# Patient Record
Sex: MALE | Race: ASIAN | NOT HISPANIC OR LATINO | URBAN - METROPOLITAN AREA
[De-identification: names, ages, dates, MRNs, and addresses within clinical notes are randomized per-mention and may not be internally consistent; named-entity substitution may affect disease eponyms.]

---

## 2019-10-13 ENCOUNTER — INPATIENT (INPATIENT)
Facility: HOSPITAL | Age: 84
LOS: 1 days | Discharge: HOME | DRG: 149 | End: 2019-10-15
Attending: PSYCHIATRY & NEUROLOGY | Admitting: PSYCHIATRY & NEUROLOGY
Payer: MEDICARE

## 2019-10-13 VITALS
TEMPERATURE: 98 F | HEIGHT: 69 IN | RESPIRATION RATE: 18 BRPM | WEIGHT: 175.05 LBS | SYSTOLIC BLOOD PRESSURE: 186 MMHG | OXYGEN SATURATION: 98 % | DIASTOLIC BLOOD PRESSURE: 77 MMHG | HEART RATE: 72 BPM

## 2019-10-13 PROCEDURE — 99285 EMERGENCY DEPT VISIT HI MDM: CPT | Mod: 25

## 2019-10-13 PROCEDURE — 93010 ELECTROCARDIOGRAM REPORT: CPT

## 2019-10-13 RX ORDER — SODIUM CHLORIDE 9 MG/ML
500 INJECTION INTRAMUSCULAR; INTRAVENOUS; SUBCUTANEOUS ONCE
Refills: 0 | Status: COMPLETED | OUTPATIENT
Start: 2019-10-13 | End: 2019-10-13

## 2019-10-13 RX ORDER — ONDANSETRON 8 MG/1
4 TABLET, FILM COATED ORAL ONCE
Refills: 0 | Status: COMPLETED | OUTPATIENT
Start: 2019-10-13 | End: 2019-10-13

## 2019-10-13 RX ORDER — MECLIZINE HCL 12.5 MG
50 TABLET ORAL ONCE
Refills: 0 | Status: COMPLETED | OUTPATIENT
Start: 2019-10-13 | End: 2019-10-13

## 2019-10-14 DIAGNOSIS — R42 DIZZINESS AND GIDDINESS: ICD-10-CM

## 2019-10-14 LAB
ALBUMIN SERPL ELPH-MCNC: 4.5 G/DL — SIGNIFICANT CHANGE UP (ref 3.3–5)
ALP SERPL-CCNC: 85 U/L — SIGNIFICANT CHANGE UP (ref 40–120)
ALT FLD-CCNC: 16 U/L — SIGNIFICANT CHANGE UP (ref 10–45)
ANION GAP SERPL CALC-SCNC: 15 MMOL/L — SIGNIFICANT CHANGE UP (ref 5–17)
ANISOCYTOSIS BLD QL: SIGNIFICANT CHANGE UP
AST SERPL-CCNC: 18 U/L — SIGNIFICANT CHANGE UP (ref 10–40)
BASOPHILS # BLD AUTO: 0 K/UL — SIGNIFICANT CHANGE UP (ref 0–0.2)
BASOPHILS NFR BLD AUTO: 0 % — SIGNIFICANT CHANGE UP (ref 0–2)
BILIRUB SERPL-MCNC: 0.5 MG/DL — SIGNIFICANT CHANGE UP (ref 0.2–1.2)
BUN SERPL-MCNC: 18 MG/DL — SIGNIFICANT CHANGE UP (ref 7–23)
CALCIUM SERPL-MCNC: 8.8 MG/DL — SIGNIFICANT CHANGE UP (ref 8.4–10.5)
CHLORIDE SERPL-SCNC: 100 MMOL/L — SIGNIFICANT CHANGE UP (ref 96–108)
CO2 SERPL-SCNC: 26 MMOL/L — SIGNIFICANT CHANGE UP (ref 22–31)
CREAT SERPL-MCNC: 1.02 MG/DL — SIGNIFICANT CHANGE UP (ref 0.5–1.3)
DACRYOCYTES BLD QL SMEAR: SLIGHT — SIGNIFICANT CHANGE UP
ELLIPTOCYTES BLD QL SMEAR: SLIGHT — SIGNIFICANT CHANGE UP
EOSINOPHIL # BLD AUTO: 0.09 K/UL — SIGNIFICANT CHANGE UP (ref 0–0.5)
EOSINOPHIL NFR BLD AUTO: 0.9 % — SIGNIFICANT CHANGE UP (ref 0–6)
GIANT PLATELETS BLD QL SMEAR: PRESENT — SIGNIFICANT CHANGE UP
GLUCOSE SERPL-MCNC: 127 MG/DL — HIGH (ref 70–99)
HCT VFR BLD CALC: 42.9 % — SIGNIFICANT CHANGE UP (ref 39–50)
HGB BLD-MCNC: 12.7 G/DL — LOW (ref 13–17)
HYPOCHROMIA BLD QL: SLIGHT — SIGNIFICANT CHANGE UP
LYMPHOCYTES # BLD AUTO: 0.41 K/UL — LOW (ref 1–3.3)
LYMPHOCYTES # BLD AUTO: 4.3 % — LOW (ref 13–44)
MACROCYTES BLD QL: SLIGHT — SIGNIFICANT CHANGE UP
MANUAL SMEAR VERIFICATION: SIGNIFICANT CHANGE UP
MCHC RBC-ENTMCNC: 18.7 PG — LOW (ref 27–34)
MCHC RBC-ENTMCNC: 29.6 GM/DL — LOW (ref 32–36)
MCV RBC AUTO: 63.1 FL — LOW (ref 80–100)
MICROCYTES BLD QL: SIGNIFICANT CHANGE UP
MONOCYTES # BLD AUTO: 0.09 K/UL — SIGNIFICANT CHANGE UP (ref 0–0.9)
MONOCYTES NFR BLD AUTO: 0.9 % — LOW (ref 2–14)
NEUTROPHILS # BLD AUTO: 8.85 K/UL — HIGH (ref 1.8–7.4)
NEUTROPHILS NFR BLD AUTO: 91.3 % — HIGH (ref 43–77)
NEUTS BAND # BLD: 1.7 % — SIGNIFICANT CHANGE UP (ref 0–8)
OVALOCYTES BLD QL SMEAR: SLIGHT — SIGNIFICANT CHANGE UP
PLAT MORPH BLD: ABNORMAL
PLATELET # BLD AUTO: 167 K/UL — SIGNIFICANT CHANGE UP (ref 150–400)
POIKILOCYTOSIS BLD QL AUTO: SLIGHT — SIGNIFICANT CHANGE UP
POLYCHROMASIA BLD QL SMEAR: SLIGHT — SIGNIFICANT CHANGE UP
POTASSIUM SERPL-MCNC: 4.3 MMOL/L — SIGNIFICANT CHANGE UP (ref 3.5–5.3)
POTASSIUM SERPL-SCNC: 4.3 MMOL/L — SIGNIFICANT CHANGE UP (ref 3.5–5.3)
PROT SERPL-MCNC: 7.2 G/DL — SIGNIFICANT CHANGE UP (ref 6–8.3)
RBC # BLD: 6.8 M/UL — HIGH (ref 4.2–5.8)
RBC # FLD: 18.6 % — HIGH (ref 10.3–14.5)
RBC BLD AUTO: ABNORMAL
SICKLE CELLS BLD QL SMEAR: SLIGHT — SIGNIFICANT CHANGE UP
SODIUM SERPL-SCNC: 141 MMOL/L — SIGNIFICANT CHANGE UP (ref 135–145)
TARGETS BLD QL SMEAR: SLIGHT — SIGNIFICANT CHANGE UP
TROPONIN T, HIGH SENSITIVITY RESULT: 10 NG/L — SIGNIFICANT CHANGE UP (ref 0–51)
TROPONIN T, HIGH SENSITIVITY RESULT: 10 NG/L — SIGNIFICANT CHANGE UP (ref 0–51)
VARIANT LYMPHS # BLD: 0.9 % — SIGNIFICANT CHANGE UP (ref 0–6)
WBC # BLD: 9.52 K/UL — SIGNIFICANT CHANGE UP (ref 3.8–10.5)
WBC # FLD AUTO: 9.52 K/UL — SIGNIFICANT CHANGE UP (ref 3.8–10.5)

## 2019-10-14 PROCEDURE — 70551 MRI BRAIN STEM W/O DYE: CPT | Mod: 26

## 2019-10-14 PROCEDURE — 70498 CT ANGIOGRAPHY NECK: CPT | Mod: 26

## 2019-10-14 PROCEDURE — 70496 CT ANGIOGRAPHY HEAD: CPT | Mod: 26

## 2019-10-14 PROCEDURE — 99223 1ST HOSP IP/OBS HIGH 75: CPT

## 2019-10-14 RX ORDER — ALLOPURINOL 300 MG
1 TABLET ORAL
Qty: 0 | Refills: 0 | DISCHARGE

## 2019-10-14 RX ORDER — ATENOLOL 25 MG/1
1 TABLET ORAL
Qty: 0 | Refills: 0 | DISCHARGE

## 2019-10-14 RX ORDER — ASPIRIN/CALCIUM CARB/MAGNESIUM 324 MG
81 TABLET ORAL DAILY
Refills: 0 | Status: DISCONTINUED | OUTPATIENT
Start: 2019-10-14 | End: 2019-10-15

## 2019-10-14 RX ORDER — ATENOLOL 25 MG/1
50 TABLET ORAL DAILY
Refills: 0 | Status: DISCONTINUED | OUTPATIENT
Start: 2019-10-14 | End: 2019-10-15

## 2019-10-14 RX ORDER — ATORVASTATIN CALCIUM 80 MG/1
80 TABLET, FILM COATED ORAL AT BEDTIME
Refills: 0 | Status: DISCONTINUED | OUTPATIENT
Start: 2019-10-14 | End: 2019-10-15

## 2019-10-14 RX ORDER — LOSARTAN POTASSIUM 100 MG/1
100 TABLET, FILM COATED ORAL DAILY
Refills: 0 | Status: DISCONTINUED | OUTPATIENT
Start: 2019-10-14 | End: 2019-10-15

## 2019-10-14 RX ORDER — DIAZEPAM 5 MG
2 TABLET ORAL ONCE
Refills: 0 | Status: DISCONTINUED | OUTPATIENT
Start: 2019-10-14 | End: 2019-10-14

## 2019-10-14 RX ORDER — LOSARTAN POTASSIUM 100 MG/1
1 TABLET, FILM COATED ORAL
Qty: 0 | Refills: 0 | DISCHARGE

## 2019-10-14 RX ORDER — AMLODIPINE BESYLATE 2.5 MG/1
1 TABLET ORAL
Qty: 0 | Refills: 0 | DISCHARGE

## 2019-10-14 RX ORDER — CLOPIDOGREL BISULFATE 75 MG/1
75 TABLET, FILM COATED ORAL ONCE
Refills: 0 | Status: COMPLETED | OUTPATIENT
Start: 2019-10-14 | End: 2019-10-14

## 2019-10-14 RX ORDER — AMLODIPINE BESYLATE 2.5 MG/1
2.5 TABLET ORAL DAILY
Refills: 0 | Status: DISCONTINUED | OUTPATIENT
Start: 2019-10-14 | End: 2019-10-15

## 2019-10-14 RX ORDER — ENOXAPARIN SODIUM 100 MG/ML
40 INJECTION SUBCUTANEOUS AT BEDTIME
Refills: 0 | Status: DISCONTINUED | OUTPATIENT
Start: 2019-10-14 | End: 2019-10-15

## 2019-10-14 RX ADMIN — Medication 2 MILLIGRAM(S): at 04:28

## 2019-10-14 RX ADMIN — Medication 50 MILLIGRAM(S): at 00:12

## 2019-10-14 RX ADMIN — Medication 81 MILLIGRAM(S): at 16:13

## 2019-10-14 RX ADMIN — ATORVASTATIN CALCIUM 80 MILLIGRAM(S): 80 TABLET, FILM COATED ORAL at 21:06

## 2019-10-14 RX ADMIN — ATENOLOL 50 MILLIGRAM(S): 25 TABLET ORAL at 16:14

## 2019-10-14 RX ADMIN — SODIUM CHLORIDE 500 MILLILITER(S): 9 INJECTION INTRAMUSCULAR; INTRAVENOUS; SUBCUTANEOUS at 00:13

## 2019-10-14 RX ADMIN — LOSARTAN POTASSIUM 100 MILLIGRAM(S): 100 TABLET, FILM COATED ORAL at 18:08

## 2019-10-14 RX ADMIN — ENOXAPARIN SODIUM 40 MILLIGRAM(S): 100 INJECTION SUBCUTANEOUS at 21:06

## 2019-10-14 RX ADMIN — AMLODIPINE BESYLATE 2.5 MILLIGRAM(S): 2.5 TABLET ORAL at 18:08

## 2019-10-14 RX ADMIN — CLOPIDOGREL BISULFATE 75 MILLIGRAM(S): 75 TABLET, FILM COATED ORAL at 08:21

## 2019-10-14 RX ADMIN — SODIUM CHLORIDE 500 MILLILITER(S): 9 INJECTION INTRAMUSCULAR; INTRAVENOUS; SUBCUTANEOUS at 01:15

## 2019-10-14 RX ADMIN — ONDANSETRON 4 MILLIGRAM(S): 8 TABLET, FILM COATED ORAL at 00:12

## 2019-10-14 NOTE — ED ADULT NURSE REASSESSMENT NOTE - NS ED NURSE REASSESS COMMENT FT1
Neuro at bedside. Neuro at bedside. Requesting to have pt changed to be admitted to Neurology. RN requests PA to change admit order to reflect this. Statement Selected

## 2019-10-14 NOTE — H&P ADULT - ATTENDING COMMENTS
Mr. Watts is a 85 yo M with HTN, HLD, gout presents to the ED with sudden onset dizziness. Son at bedside assists in providing the history. Patient was getting up off the couch to go to the bathroom at around 8:30 pm last night, when he all of a sudden developed dizziness.   his neurological exam is nonfocal.  Impression: Symptoms are suggestive of benign paroxysmal positional vertigo, initially admitted to the stroke service for possible posterior circulation ischemia in the setting of intracranial vertebral artery atherosclerosis.  MRI brain no evidence of ischemic infarct, recommend vestibular therapy  Continue aspirin, statins, DVT prophylaxis  PT OT and rehabilitation consult

## 2019-10-14 NOTE — H&P ADULT - NSHPLABSRESULTS_GEN_ALL_CORE
< from: CT Angio Neck w/ IV Cont (10.14.19 @ 05:09) >    IMPRESSION:     Noncontrast CT brain: No acute intracranial hemorrhage, mass effect,or   CT evidence of an acute vascular territorial infarct. Chronic ischemic   changes in the frontoparietal white matter.    CT angiography neck: Mild to moderate atherosclerotic disease at the   common carotid artery bifurcations. No significant stenosis of the   cervical internal carotid arteries based on NASCET criteria. Patent   cervical vertebral arteries. Mild luminal irregularity with   atherosclerotic calcification of the distal V3 segment of the right   vertebral artery likely due to atherosclerotic disease.    CT angiography brain: Moderate to severe stenosis of the supraclinoid   segments of the internal carotid artery secondary to atherosclerotic   calcification.    Atherosclerotic calcifications of the intradural vertebral arteries with   severe stenosis involving the proximal intradural right vertebral artery   which is near occluded. Right PICA origin is visualized.    Multifocal mild stenoses involving the distal M1 and M2 branches of the   middle cerebral artery branches bilaterally.    Mild luminal irregularity of the basilar artery likely related to   atherosclerotic disease. Short segment mild stenoses of the proximal PCAs.    < end of copied text >

## 2019-10-14 NOTE — ED ADULT NURSE REASSESSMENT NOTE - GENERAL PATIENT STATE
family/SO at bedside/resting/sleeping/comfortable appearance/cooperative
resting/sleeping/comfortable appearance/family/SO at bedside/cooperative/no change observed
resting/sleeping/smiling/interactive/family/SO at bedside/comfortable appearance/cooperative

## 2019-10-14 NOTE — H&P ADULT - NSHPPHYSICALEXAM_GEN_ALL_CORE
Neurological Exam:  Mental Status: Orientated to self, date and place.  Attention intact.  No dysarthria. Speech fluent.  Cranial Nerves: EOMI, VFF, no nystagmus.   No facial asymmetry.  Tongue midline.            Strength:     [] Upper extremity                      Delt       Bicep    Tricep                                                  R         5/5 5/5 5/5 5/5                                               L          5/5 5/5 5/5 5/5  [] Lower extremity                       HF          KE          KF        DF         PF                                               R        5/5 5/5 5/5 5/5 5/5                                               L         5/5 5/5 5/5 5/5 5/5  Pronator drift: none                 Dysmetria: None to finger-nose-finger or heel-shin-heel  No truncal ataxia.    Tremor: No resting, postural or action tremor.  No myoclonus.    Sensation: intact to light touch, pinprick, vibration and proprioception    Deep Tendon Reflexes:     Biceps          Triceps      BR        Patellar        Ankle         Babinski                                         R       2+                   2+           2+            2+               2+           downgoing                                         L        2+                  2+           2+            2+               2+           downgoing    Gait: normal. Neurological Exam:  Mental Status: Orientated to self, date and place.  Attention intact.  No dysarthria. Speech fluent.  Cranial Nerves: EOMI, VFF, no nystagmus.   No facial asymmetry.  Tongue midline.            Strength:     [] Upper extremity                      Delt       Bicep    Tricep                                                  R         5/5 5/5 5/5 5/5                                               L          5/5 5/5 5/5       5/5    [] Lower extremity                       HF          KE          KF                                                       R        5/5 5/5 5/5                                                      L         5/5 5/5       5/5         Pronator drift: none                 Dysmetria: None to finger-nose-finger or heel-shin-heel    Sensation: intact to light touch      Gait: normal. Neurological Exam:  Mental Status: Orientated to self, date and place.  Attention intact.  No dysarthria. Speech fluent.  Cranial Nerves: EOMI, VFF, no nystagmus.   No facial asymmetry.  Tongue midline.            Strength:     [] Upper extremity                      Delt       Bicep    Tricep                                                  R         5/5 5/5 5/5 5/5                                               L          5/5 5/5 5/5 5/5    [] Lower extremity                       HF          KE          KF                                                       R        5/5 5/5 5/5                                                      L         5/5 5/5 5/5         Pronator drift: none                 Dysmetria: None to finger-nose-finger or heel-shin-heel    Sensation: intact to light touch    Gait: unable to ambulate    could not tolerate head thrust manuever  [place] hallpike deferred

## 2019-10-14 NOTE — ED PROVIDER NOTE - PROGRESS NOTE DETAILS
Bonifacio: Patient has had no relief after meclizine and now valium Bonifacio: Patient has had no relief after meclizine and now valium.  CTA head neck pending, neuro will be consulted and patient to be admitted.

## 2019-10-14 NOTE — OCCUPATIONAL THERAPY INITIAL EVALUATION ADULT - PERTINENT HX OF CURRENT PROBLEM, REHAB EVAL
84M with HTN, HLD, gout presents to the ED with sudden onset dizziness. Son at bedside assists in providing the history. Josh was getting up off the couch to go to the bathroom at around 8:30 pm last night, when he all of a sudden developed dizziness. Reports a sensation of off balance. Movement in either direction makes symptoms worse.

## 2019-10-14 NOTE — ED ADULT NURSE REASSESSMENT NOTE - NS ED NURSE REASSESS COMMENT FT1
Spoke to Neuro MD, pt okay to stay as Tele Admit and go to 4 [facility] bed. Spoke to Neuro MD, pt okay to stay as Tele Admit and go to 4 [facility] bed.  Pt AAOx3, NAD, resp nonlabored, resting comfortably in bed with family at bedside, pt is neurologically intact at this time. Pt c/o same dizziness with head movement, none at rest at this time.   Pt denies headache, chest pain, palpitations, SOB, abd pain, n/v/d, fevers, chills, weakness, numbness/tingling at this time. Pt awaiting bed. Safety maintained.

## 2019-10-14 NOTE — ED ADULT NURSE NOTE - NSIMPLEMENTINTERV_GEN_ALL_ED
Implemented All Fall Risk Interventions:  [place] to call system. Call bell, personal items and telephone within reach. Instruct patient to call for assistance. Room bathroom lighting operational. Non-slip footwear when patient is off stretcher. Physically safe environment: no spills, clutter or unnecessary equipment. Stretcher in lowest position, wheels locked, appropriate side rails in place. Provide visual cue, wrist band, yellow gown, etc. Monitor gait and stability. Monitor for mental status changes and reorient to person, place, and time. Review medications for side effects contributing to fall risk. Reinforce activity limits and safety measures with patient and family.

## 2019-10-14 NOTE — ED PROVIDER NOTE - ATTENDING CONTRIBUTION TO CARE
I performed a history and physical exam of the patient and discussed their management with the resident.  I reviewed the resident's note and agree with the documented findings and plan of care except as noted below. My medical decision making and observations are as follows:    84 M pmh HTN p/w dizziness nausea/vomiting I performed a history and physical exam of the patient and discussed their management with the resident.  I reviewed the resident's note and agree with the documented findings and plan of care except as noted below. My medical decision making and observations are as follows:    84 M pmh HTN p/w sudden onset dizziness nausea/vomiting.  Pt was laying on couch watching tv, rolled over to the side and had sudden dizziness described as "drowsiness sensation."  Pt went ot the bathroom, when he laid back down on the couch, the dizziness was worse and patient had nausea/vomiting.  2 more episodes of nbnb vomiting prior to being seen in ER.  denies chest pain, shortness of breath, abd pain, f/c.  no sick contacts. Dizziness is better at rest and worse with any head/body movements.  Pt appears uncomfortable, A&O x 3, heart rrr, lungs cta, abd soft ntnd, CN 2-12 in tact, 5/5 strength and equal sensation bilaterally, normal finger to nose, no pronator drift, PERRL, EOMI, TM clear but moderate cerumen in right ear.  Positional symptoms sound vertiginous in nature, however differential includes cardiac and neuro causes.  Will check labs, EKG, give symptoms control fluids and low threshold for neuro imaging if symptoms do not resolve with meds. I performed a history and physical exam of the patient and discussed their management with the resident.  I reviewed the resident's note and agree with the documented findings and plan of care except as noted below. My medical decision making and observations are as follows:    84 M pmh HTN p/w sudden onset dizziness nausea/vomiting.  Pt was laying on couch watching tv, rolled over to the side and had sudden dizziness described as "drowsiness sensation."  Pt went ot the bathroom, when he laid back down on the couch, the dizziness was worse and patient had nausea/vomiting.  2 more episodes of nbnb vomiting prior to being seen in ER.  denies chest pain, shortness of breath, abd pain, f/c.  no sick contacts. Dizziness is better at rest and worse with any head/body movements, however dizziness does not resolve at rest.  Pt appears uncomfortable, A&O x 3, heart rrr, lungs cta, abd soft ntnd, CN 2-12 in tact, 5/5 strength and equal sensation bilaterally, normal finger to nose, no pronator drift, PERRL, EOMI, TM clear but moderate cerumen in right ear.  Positional symptoms sound vertiginous in nature, however differential includes cardiac and neuro causes.  Will check labs, EKG, give symptoms control fluids and low threshold for neuro imaging if symptoms do not resolve with meds.

## 2019-10-14 NOTE — ED PROVIDER NOTE - CLINICAL SUMMARY MEDICAL DECISION MAKING FREE TEXT BOX
85yo male presenting with vertigo symptoms.  History and pe point toward peripheral cause - will treat symptomatically and reassess.  Will obtain basic labs.  Less likely cardiac but will obtain EKG, trop.  Will obtain imaging if symptoms do not improve.

## 2019-10-14 NOTE — H&P ADULT - HISTORY OF PRESENT ILLNESS
83 yo M with HTN, HLD, gout presents to the ED with sudden onset dizziness. Son at bedside assists in providing the history. Josh was getting up off the couch to go to the bathroom at around 8:30 pm last night, when he all of a sudden developed dizziness. Denies room spinning sensation. Reports a sensation of off balance. Movement in either direction makes symptoms worse. No prior history. No diplopia, dysphagia, numbness, weakness.

## 2019-10-14 NOTE — H&P ADULT - ASSESSMENT
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    Plan:  - Aspirin and clopidogrel for aggressive secondary stroke prevention for 3 months considering symptomatic intracranial large vessel severe atherosclerotic stenosis followed by single antiplatelet agent  - Agree with pharmacological DVT prophylaxis   - Atorvastatin 80 mg at bedtime once able to pass the swallow evaluation or enteral access is established; titrate the dose according to LDL  - HbA1C and LDL  - Permissive HTN up to 220/110 mmHg for first 48-72 hours from symptom onset followed by gradual normotension  - MRI brain without contrast   - TTE with bubble study and continue with telemetry to screen for possible cardiac source of embolism  - Prolonged cardiac monitoring with ICM to screen for occult cardiac arrhythmia like atrial fibrillation being the cause of possible cardiac source of embolism to be considered based on results of above mentioned cardiac tests    - PT/OT

## 2019-10-14 NOTE — ED ADULT NURSE REASSESSMENT NOTE - NS ED NURSE REASSESS COMMENT FT1
Pt back from MRI. Pt is AAOx3, NAD, resp nonlabored, resting comfortably in bed with son at bedside. Pt is neurologically intact at this time. Pt c/o same dizziness with moving head. Pt denies headache, chest pain, palpitations, SOB, abd pain, n/v/d, fevers, chills, weakness at this time. Pt awaiting bed. Safety maintained.

## 2019-10-14 NOTE — OCCUPATIONAL THERAPY INITIAL EVALUATION ADULT - ADDITIONAL COMMENTS
CT Angio Head (10/14): Multifocal mild stenoses involving the distal M1 and M2 branches of the   middle cerebral artery branches bilaterally. Mild luminal irregularity of the basilar artery likely related to   atherosclerotic disease. Short segment mild stenoses of the proximal PCAs. MRI Head (10/14): No acute infarcts or acute hemorrhage. Small old microhemorrhage right ventral eliza.

## 2019-10-14 NOTE — ED ADULT NURSE REASSESSMENT NOTE - NS ED NURSE REASSESS COMMENT FT1
Report received from Ezequiel KERR. Pt AAOx4, NAD, resp nonlabored, skin warm/dry, resting comfortably in bed with family at bedside. Pt c/o dizziness with head movement. Pt denies headache, chest pain, palpitations, SOB, abd pain, n/v/d, fevers, chills, weakness at this time. Pt awaiting dispo. Safety maintained.

## 2019-10-14 NOTE — CONSULT NOTE ADULT - SUBJECTIVE AND OBJECTIVE BOX
HPI: 85 yo M with HTN, HLD, gout presents to the ED with sudden onset dizziness. Son at bedside assists in providing the history. Josh was getting up off the couch to go to the bathroom at around 8:30 pm last night, when he all of a sudden developed dizziness. Denies room spinning sensation. Reports a sensation of off balance. Movement in either direction makes symptoms worse. No prior history. No diplopia, dysphagia, numbness, weakness.    (Stroke only)  NIHSS:   MRS: 0    REVIEW OF SYSTEMS  General:	  Skin/Breast:	  Ophthalmologic:  ENMT:	  Respiratory and Thorax:	  Cardiovascular:	  Gastrointestinal:	  Genitourinary:	  Musculoskeletal:	  Neurological:	  Psychiatric:	  Hematology/Lymphatics:	  Endocrine:	  Allergic/Immunologic:	    A 10-system ROS was performed and is negative except for those items noted above and/or in the HPI.    PAST MEDICAL & SURGICAL HISTORY:  No pertinent past medical history  No significant past surgical history    FAMILY HISTORY:    SOCIAL HISTORY:   T/E/D:   Occupation:   Lives with:     MEDICATIONS (HOME):  Home Medications:    MEDICATIONS  (STANDING):    MEDICATIONS  (PRN):    ALLERGIES/INTOLERANCES:  Allergies  No Known Allergies    Intolerances    VITALS & EXAMINATION:  Vital Signs Last 24 Hrs  T(C): 36.7 (14 Oct 2019 05:20), Max: 36.8 (13 Oct 2019 22:49)  T(F): 98.1 (14 Oct 2019 05:20), Max: 98.3 (13 Oct 2019 22:49)  HR: 68 (14 Oct 2019 05:20) (68 - 73)  BP: 180/87 (14 Oct 2019 05:20) (174/83 - 186/77)  BP(mean): --  RR: 18 (14 Oct 2019 05:20) (18 - 18)  SpO2: 97% (14 Oct 2019 05:20) (95% - 100%)    General:  Constitutional: Obese Male, appears stated age, in no apparent distress including pain  Head: Normocephalic & atraumatic.  ENT: Patent ear canals, intact TM, mucus membranes moist & pink, neck supple, no lymphadenopathy.   Respiratory: Patent airway. All lung fields are clear to auscultation bilaterally.  Extremities: No cyanosis, clubbing, or edema.  Skin: No rashes, bruising, or discoloration.    Cardiovascular (>2): RRR no murmurs. Carotid pulsations symmetric, no bruits. Normal capillary beds refill, 1-2 seconds or less.     Neurological (>12):  MS: Awake, alert, oriented to person, place, situation, time. Normal affect. Follows all commands.    Language: Speech is clear, fluent with good repetition & comprehension (able to name objects___)    CNs: PERRLA (R = 3mm, L = 3mm). VFF. EOMI no nystagmus, no diplopia. V1-3 intact to LT/pinprick, well developed masseter muscles b/l. No facial asymmetry b/l, full eye closure strength b/l. Hearing grossly normal (rubbing fingers) b/l. Symmetric palate elevation in midline. Gag reflex deferred. Head turning & shoulder shrug intact b/l. Tongue midline, normal movements, no atrophy.    Fundoscopic: pale w/ sharp discs margins No vascular changes.      Motor: Normal muscle bulk & tone. No noticeable tremor or seizure. No pronator drift.              Deltoid	Biceps	Triceps	Wrist	Finger ABd	   R	5	5	5	5	5		5 	  L	5	5	5	5	5		5    	H-Flex	H-Ext	H-ABd	H-ADd	K-Flex	K-Ext	D-Flex	P-Flex  R	5	5	5	5	5	5	5	5 	   L	5	5	5	5	5	5	5	5	     Sensation: Intact to LT/PP/Temp/Vibration/Position b/l throughout.     Cortical: Extinction on DSS (neglect): none    Reflexes:              Biceps(C5)       BR(C6)     Triceps(C7)               Patellar(L4)    Achilles(S1)    Plantar Resp  R	2	          2	             2		        2		    2		Down   L	2	          2	             2		        2		    2		Down     Coordination: intact rapid-alt movements. No dysmetria to FTN/HTS    Gait: Normal Romberg. No postural instability. Normal stance and tandem gait.     LABORATORY:  CBC                       12.7   9.52  )-----------( 167      ( 14 Oct 2019 00:19 )             42.9     Chem 10-14    141  |  100  |  18  ----------------------------<  127<H>  4.3   |  26  |  1.02    Ca    8.8      14 Oct 2019 00:19    TPro  7.2  /  Alb  4.5  /  TBili  0.5  /  DBili  x   /  AST  18  /  ALT  16  /  AlkPhos  85  10-14    LFTs LIVER FUNCTIONS - ( 14 Oct 2019 00:19 )  Alb: 4.5 g/dL / Pro: 7.2 g/dL / ALK PHOS: 85 U/L / ALT: 16 U/L / AST: 18 U/L / GGT: x           Coagulopathy   Lipid Panel   A1c   Cardiac enzymes     U/A   CSF  Immunological  Other    STUDIES & IMAGING:  Studies (EKG, EEG, EMG, etc):     Radiology (XR, CT, MR, U/S, TTE/NIKKI):

## 2019-10-14 NOTE — ED ADULT NURSE NOTE - OBJECTIVE STATEMENT
84M to ED via EMS s/p sudden onset of dizzy/nausea/vomiting x2 and hypertension. Pt is alert and oriented x3, calm/cooperative, NAD, VSS. Family at bedside. Pt denies fever, chest pain, SOB, abd pain.  Pt returned from [place] with wife two weeks ago, where spouse had "fluid imbalance" in her ears and was treated with unknown medication at that time.   Pt has 20 ga to RAC.

## 2019-10-14 NOTE — OCCUPATIONAL THERAPY INITIAL EVALUATION ADULT - LIVES WITH, PROFILE
Pt lives in a house with 2 JONAS and no steps inside. Pt has a walk in shower. Does not use DME/AE, I in ADLs and functional mobility PTA.

## 2019-10-14 NOTE — ED PROVIDER NOTE - OBJECTIVE STATEMENT
85yo male presenting with sudden onset dizziness with associated nausea and vomiting.  States symptoms started while he was watching tv. 83yo male presenting with sudden onset dizziness with associated nausea and vomiting.  States symptoms started while he was watching tv around 9pm and have been present since.  States he gets dizzy when he moves his head or body and describes a drowsiness sensation with the dizziness.  He admits to 3 episodes of vomiting prior to arrival, first episode occurred with symptom onset.  Denies fevers, chills, sob, cp, palpitations, abdominal pain, diarrhea, urinary complaints.

## 2019-10-14 NOTE — ED PROVIDER NOTE - PHYSICAL EXAMINATION
General appearance: NAD, conversant, afebrile    Eyes: anicteric sclerae, TRENA, EOMI   HENT: Atraumatic;   Neck: Trachea midline; Full range of motion, supple;    Pulm: CTA bl, normal respiratory effort and no intercostal retractions, normal work of breathing   CV: RRR, No murmurs, rubs, or gallops.    Abdomen: Soft, non-tender, non-distended; no masses or hepatosplenomegaly.   Extremities: No peripheral edema   Neuro: CN2-12 grossly intact, MS +5/5 in UE and LE BL, finger to nose smooth and rapid, gross sensation intact in UE and LE BL, gait smooth and coordinated,, negative pronator drift    Skin: Dry, normal temperature, turgor and texture;    Psych: Appropriate affect, cooperative; alert and oriented to person, place and time General appearance: NAD, conversant, afebrile    Eyes: anicteric sclerae, TRENA, EOMI   HENT: Atraumatic;   Neck: Trachea midline; Full range of motion, supple;    Pulm: CTA bl, normal respiratory effort and no intercostal retractions, normal work of breathing   CV: RRR, No murmurs, rubs, or gallops.    Abdomen: Soft, non-tender, non-distended; no masses or hepatosplenomegaly.   Extremities: No peripheral edema   Neuro: CN2-12 grossly intact, MS +5/5 in UE and LE BL, finger to nose smooth but slow, gross sensation intact in UE and LE BL, negative pronator drift, patient feels too unstable to try ambulating - gait deferred    Skin: Dry, normal temperature, turgor and texture;    Psych: Appropriate affect, cooperative; alert and oriented to person, place and time

## 2019-10-15 VITALS
DIASTOLIC BLOOD PRESSURE: 77 MMHG | RESPIRATION RATE: 20 BRPM | TEMPERATURE: 99 F | HEART RATE: 76 BPM | SYSTOLIC BLOOD PRESSURE: 180 MMHG | OXYGEN SATURATION: 95 %

## 2019-10-15 LAB
CHOLEST SERPL-MCNC: 147 MG/DL — SIGNIFICANT CHANGE UP (ref 10–199)
ESTIMATED AVERAGE GLUCOSE: 103 MG/DL — SIGNIFICANT CHANGE UP (ref 68–114)
HBA1C BLD-MCNC: 5.2 % — SIGNIFICANT CHANGE UP (ref 4–5.6)
HBA1C BLD-MCNC: 5.2 % — SIGNIFICANT CHANGE UP (ref 4–5.6)
HDLC SERPL-MCNC: 26 MG/DL — LOW
LIPID PNL WITH DIRECT LDL SERPL: 77 MG/DL — SIGNIFICANT CHANGE UP
TOTAL CHOLESTEROL/HDL RATIO MEASUREMENT: 5.7 RATIO — SIGNIFICANT CHANGE UP (ref 3.4–9.6)
TRIGL SERPL-MCNC: 222 MG/DL — HIGH (ref 10–149)

## 2019-10-15 PROCEDURE — 70496 CT ANGIOGRAPHY HEAD: CPT

## 2019-10-15 PROCEDURE — 93005 ELECTROCARDIOGRAM TRACING: CPT

## 2019-10-15 PROCEDURE — 80061 LIPID PANEL: CPT

## 2019-10-15 PROCEDURE — 85027 COMPLETE CBC AUTOMATED: CPT

## 2019-10-15 PROCEDURE — 96374 THER/PROPH/DIAG INJ IV PUSH: CPT | Mod: XU

## 2019-10-15 PROCEDURE — 84484 ASSAY OF TROPONIN QUANT: CPT

## 2019-10-15 PROCEDURE — 99231 SBSQ HOSP IP/OBS SF/LOW 25: CPT | Mod: GC

## 2019-10-15 PROCEDURE — 97535 SELF CARE MNGMENT TRAINING: CPT

## 2019-10-15 PROCEDURE — 96361 HYDRATE IV INFUSION ADD-ON: CPT | Mod: XU

## 2019-10-15 PROCEDURE — 99285 EMERGENCY DEPT VISIT HI MDM: CPT | Mod: 25

## 2019-10-15 PROCEDURE — 70551 MRI BRAIN STEM W/O DYE: CPT

## 2019-10-15 PROCEDURE — 80053 COMPREHEN METABOLIC PANEL: CPT

## 2019-10-15 PROCEDURE — 97166 OT EVAL MOD COMPLEX 45 MIN: CPT

## 2019-10-15 PROCEDURE — 97530 THERAPEUTIC ACTIVITIES: CPT

## 2019-10-15 PROCEDURE — 70498 CT ANGIOGRAPHY NECK: CPT

## 2019-10-15 PROCEDURE — 83036 HEMOGLOBIN GLYCOSYLATED A1C: CPT

## 2019-10-15 RX ORDER — CLONAZEPAM 1 MG
0.5 TABLET ORAL
Refills: 0 | Status: DISCONTINUED | OUTPATIENT
Start: 2019-10-15 | End: 2019-10-15

## 2019-10-15 RX ORDER — CLONAZEPAM 1 MG
1 TABLET ORAL
Qty: 20 | Refills: 0
Start: 2019-10-15 | End: 2019-10-24

## 2019-10-15 RX ADMIN — LOSARTAN POTASSIUM 100 MILLIGRAM(S): 100 TABLET, FILM COATED ORAL at 05:24

## 2019-10-15 RX ADMIN — Medication 0.5 MILLIGRAM(S): at 12:56

## 2019-10-15 RX ADMIN — AMLODIPINE BESYLATE 2.5 MILLIGRAM(S): 2.5 TABLET ORAL at 05:24

## 2019-10-15 RX ADMIN — ATENOLOL 50 MILLIGRAM(S): 25 TABLET ORAL at 05:24

## 2019-10-15 NOTE — PHYSICAL THERAPY INITIAL EVALUATION ADULT - DISCHARGE DISPOSITION, PT EVAL
outpatient PT/DC home with outpt vestibular PT services, recommend rolling walker,assist from family, will be staying at sons per CLINT Martínez.

## 2019-10-15 NOTE — PHYSICAL THERAPY INITIAL EVALUATION ADULT - PRECAUTIONS/LIMITATIONS, REHAB EVAL
CT Angio Head (10/14): Multifocal mild stenoses involving the distal M1 and M2 branches of the/fall precautions

## 2019-10-15 NOTE — PHYSICAL THERAPY INITIAL EVALUATION ADULT - ADDITIONAL COMMENTS
· Lives With: Pt lives in a house with 2 JONAS and no steps inside. Pt has a walk in shower. Does not use DME/AE, I in ADLs and functional mobility PTA.

## 2019-10-15 NOTE — PHYSICAL THERAPY INITIAL EVALUATION ADULT - GENERAL OBSERVATIONS, REHAB EVAL
t is A&OX4, english speaking, follwos all commands, received amb at bedside with OT at bedside. AMb limited 2* dizziness/woozy.

## 2019-10-15 NOTE — DISCHARGE NOTE PROVIDER - NSDCCPCAREPLAN_GEN_ALL_CORE_FT
PRINCIPAL DISCHARGE DIAGNOSIS  Diagnosis: Peripheral vertigo  Assessment and Plan of Treatment: PRINCIPAL DISCHARGE DIAGNOSIS  Diagnosis: Peripheral vertigo  Assessment and Plan of Treatment: Outpatient vestibular therapy  Follow up with neurologist

## 2019-10-15 NOTE — DISCHARGE NOTE NURSING/CASE MANAGEMENT/SOCIAL WORK - PATIENT PORTAL LINK FT
You can access the FollowMyHealth Patient Portal offered by [facility] by registering at the following website: [email or web address]. By joining [facility]’s FollowMyHealth portal, you will also be able to view your health information using other applications (apps) compatible with our system.

## 2019-10-15 NOTE — PHYSICAL THERAPY INITIAL EVALUATION ADULT - PERTINENT HX OF CURRENT PROBLEM, REHAB EVAL
Pt is a 83 yo M admitted to [facility] with hx HTN, HLD, gout presents to the ED with sudden onset dizziness.Pt was getting up off the couch to go to the bathroom at around 8:30 pm last night, when he all of a sudden developed dizziness. Denies room spinning sensation. Reports a sensation of off balance. Movement in either direction makes symptoms worse. No prior history. No diplopia, dysphagia, numbness, weakness.

## 2019-10-15 NOTE — DISCHARGE NOTE NURSING/CASE MANAGEMENT/SOCIAL WORK - FLU SEASON?
You were seen in the emergency department for pain in your foot.  Your x-rays and exam were reassuring.  The cause of your pain is not clear, however could be tendinitis, stress fracture, or other injuries.  This does not appear to be dangerous at the moment.  Please follow-up with your regular doctor.  You are also being provided with information for a referral to orthopedics if needed.  Please wear firm soled shoes, limit the time you are walking.  Being provided with crutches to use as needed.    For pain you can take ibuprofen (Motrin), 600mg every 6 hours as needed for pain (take with food to avoid GI upset). You can also take acetaminophen (Tylenol), 1000mg every 8 hours as needed for pain. Do not take more than 3000mg of acetaminophen in any 24 hour period.  Taking these medications regularly during the day can be very effective in controlling pain.    Please return to the emergency department or seek medical attention if you develop:  Fevers, difficulty breathing, cold dusky toes, any other new or concerning findings   Yes...

## 2019-10-15 NOTE — DISCHARGE NOTE PROVIDER - HOSPITAL COURSE
83 yo M with HTN, HLD, gout presents to the ED with sudden onset dizziness. Son at bedside assists in providing the history. Josh was getting up off the couch to go to the bathroom at around 8:30 pm last night, when he all of a sudden developed dizziness. Denies room spinning sensation. Reports a sensation of off balance. Movement in either direction makes symptoms worse. No prior history. No diplopia, dysphagia, numbness, weakness.        < from: CT Angio Neck w/ IV Cont (10.14.19 @ 05:09) >    IMPRESSION:         Noncontrast CT brain: No acute intracranial hemorrhage, mass effect,or     CT evidence of an acute vascular territorial infarct. Chronic ischemic     changes in the frontoparietal white matter.        CT angiography neck: Mild to moderate atherosclerotic disease at the     common carotid artery bifurcations. No significant stenosis of the     cervical internal carotid arteries based on NASCET criteria. Patent     cervical vertebral arteries. Mild luminal irregularity with     atherosclerotic calcification of the distal V3 segment of the right     vertebral artery likely due to atherosclerotic disease.        CT angiography brain: Moderate to severe stenosis of the supraclinoid     segments of the internal carotid artery secondary to atherosclerotic     calcification.        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 Right PICA origin is visualized.        Multifocal mild stenoses involving the distal M1 and M2 branches of the     middle cerebral artery branches bilaterally.        Mild luminal irregularity of the basilar artery likely related to     atherosclerotic disease. Short segment mild stenoses of the proximal PCAs.            SANJAY LEAL M.D., RADIOLGY RESIDENT        < end of copied text >        < from: MR Head No Cont (10.14.19 @ 11:23) >    IMPRESSION: No acute infarcts or acute hemorrhage. Age-appropriate     involutional and ischemic gliotic changes. Small old microhemorrhage     right ventral eliza.        MARY KIM M.D., ATTENDING RADIOLOGIST    This document has been electronically signed. Oct 14 2019 11:19AM              < end of copied text >        PT/OT recommending home w/ home PT. Patient requesting outpatient PT. 85 yo M with HTN, HLD, gout presents to the ED with sudden onset dizziness. Son at bedside assists in providing the history. Josh was getting up off the couch to go to the bathroom at around 8:30 pm last night, when he all of a sudden developed dizziness. Denies room spinning sensation. Reports a sensation of off balance. Movement in either direction makes symptoms worse. No prior history. No diplopia, dysphagia, numbness, weakness.        < from: CT Angio Neck w/ IV Cont (10.14.19 @ 05:09) >    IMPRESSION:         Noncontrast CT brain: No acute intracranial hemorrhage, mass effect,or     CT evidence of an acute vascular territorial infarct. Chronic ischemic     changes in the frontoparietal white matter.        CT angiography neck: Mild to moderate atherosclerotic disease at the     common carotid artery bifurcations. No significant stenosis of the     cervical internal carotid arteries based on NASCET criteria. Patent     cervical vertebral arteries. Mild luminal irregularity with     atherosclerotic calcification of the distal V3 segment of the right     vertebral artery likely due to atherosclerotic disease.        CT angiography brain: Moderate to severe stenosis of the supraclinoid     segments of the internal carotid artery secondary to atherosclerotic     calcification.        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 Right PICA origin is visualized.        Multifocal mild stenoses involving the distal M1 and M2 branches of the     middle cerebral artery branches bilaterally.        Mild luminal irregularity of the basilar artery likely related to     atherosclerotic disease. Short segment mild stenoses of the proximal PCAs.            SANJAY LEAL M.D., RADIOLGY RESIDENT            < from: MR Head No Cont (10.14.19 @ 11:23) >    IMPRESSION: No acute infarcts or acute hemorrhage. Age-appropriate     involutional and ischemic gliotic changes. Small old microhemorrhage     right ventral eliza.        MARY KIM M.D., ATTENDING RADIOLOGIST    This document has been electronically signed. Oct 14 2019 11:19AM                      PT/OT recommending home w/ home PT. Patient requesting outpatient PT.

## 2020-11-06 NOTE — DISCHARGE NOTE PROVIDER - CARE PROVIDER_API CALL
Occupational Therapy  Visit Type: treatment  Precautions:  Medical precautions:  fall risk; contact & special precautions.  Covid+  Lines:     Basic: O2 and IV  Vision:     Current vision: wears glasses all the time  Safety Measures: bed alarm and chair alarm    SUBJECTIVE                                                                                                            Patient agreed to participate in therapy this date.    Patient is a 79 year old male admitted to [facility] for hypoxia, rapid covid test positive.  Pt lives with spouse and in a multi-level house.  At baseline, patient is Modified Independent with functional mobility tasks and self cares using standard cane.     Patient / Family Goal: return to previous functional status and maximize function      OBJECTIVE                                                                                                                5L O2, satting 88-90% with activity, however demonstrates WRIGHT, recovers slowly (>3mins).Level of consciousness: alert      Affect/Behavior: alert and irritable  Patient activity tolerance: 1 to 1 activity to rest and 1 to 2 activity to rest  Functional Communication/Cognition    Overall status:  Impaired    Attention span:     Attention Span Impairment: reduced memory    Commands: follows one step commands with increased time and follows one step commands with repetition.    Transition between tasks: transition with cues.    Safety judgement: decreased awareness of need for assistance and decreased awareness of need for safety.    Awareness of deficits: decreased awareness of deficits and assistance required to compensate for deficits.  Balance  Sitting:    Static:  supervision     Dynamic:  supervision  Standing - Firm Surface - Eyes Open:     Static: contact guard/touching/steadying assist and supervision    Dynamic:  minimal assist and contact guard/touching/steadying assist    Bed mobility:    Rolling left: set up and supervision     Supine to sit: supervision and set up  Transfers:    Assistive devices: gait belt and straight cane    Sit to stand: moderate assist and minimal assist    Stand to sit: minimal assist  Training completed:      Education details: body mechanics, patient safety and patient requires additional training  Functional Ambulation:    Assistance:minimal assist   Assistive device:gait belt and straight cane  Details/Comments: Patient declines safety education for 2ww use  Activities of Daily Living (ADLs):  Toilet transfer:     Assist: moderate assist    Device: gait belt    Equipment: grab bar use  Toileting:     Assist: minimal assist    Assist needed for: increased time to complete, verbal cueing, supervision/safety and clothing management down    Equipment: grab bar use      Interventions                                                                                                                 Training provided: ADL training, activity tolerance, safety training, positioning, transfer training, cognitive training and functional ambulation  Skilled input: verbal instruction/cues and tactile instruction/cues  Verbal Consent: Writer verbally educated and received verbal consent for hand placement, positioning of patient, and techniques to be performed today from patient for therapist position for techniques and hand placement and palpation for techniques as described above and how they are pertinent to the patient's plan of care.        ASSESSMENT                                                                                                                  Visit # since seen by OT:  1    Patient is displaying fair progress as evidenced by continued decreased safety awareness, demonstrated through patient participation in bed mobility, ADL ambulation, functional toilet transfer, and toileting cares. Patient declines 2ww use despite writer encouragement and OT POC education. Min A provided secondary to balance  instabilities, functional cognitive deficits impacting short term memory retention and safety awareness. Toileting completed with min A. Patient requires extended period of time for all activities this date. At this time the patient continues to demonstrate decreased range of motion, decreased strength, balance deficits, diminished safety awareness, pain, poor coordination, decreased activity tolerance, postural problems, decreased endurance, shortness of breath, diminished cognition which is limiting the completion of bed mobility, all ADLs and all functional mobility.  Further skilled occupational therapy is required to address these limitations in attempt to maximize the patient's independence.    Discharge Recommendations  Recommendations for Discharge: OT WI: Post acute therapy, 24 Hour assist            PT/OT Mobility Equipment for Discharge: has ww and cane  PT/OT ADL Equipment for Discharge: NA- owns shower chair  OT Identified Barriers to Discharge: fall risk     Skilled therapy is required to address these limitations in attempt to maximize the patient's independence.  Progress: slow progress, decreased activity tolerance    End of Session:   Location: in chair  Safety measures: alarm system in place/re-engaged, lines intact and call light within reach  Handoff to: nurse    PLAN                                                                                                                          Suggestions for next session as indicated: LB dressing, grooming standing sink side, education on 2ww for safe mobility, energy conservation with handout    OT Frequency: 4 days/week         Interventions: activity tolerance training, ADL retraining, balance, bed mobility training, body mechanics, compensatory technique education, equipment eval/education, functional transfer training, patient/family training, positioning, transfer training, therapeutic activity, therapeutic exercise, upper extremity  strengthening/ROM, use of adaptive equipment and IADL  Agreement to plan and goals: patient agrees with goals and treatment plan      GOALS:  Review Date: 11/10/2020  Long Term Goals: (to be met by time of discharge from hospital)  Grooming: Patient will complete grooming tasks modified independent.  Lower body dressing: Patient will complete lower body dressing modified independent.  Toileting: Patient will complete toileting modified independent.  Toilet transfer: Patient will complete toilet transfer with gait belt and 2-wheeled walker, modified independent.         Documented in the chart in the following areas: Pain. Assessment. Plan.       Kyung Jaimes)  Neurology  [address], [address]  [place] [address]  Phone: [phone]  Fax: [phone]  Follow Up Time:

## 2021-08-24 NOTE — PATIENT PROFILE ADULT - COMPLETE THE FOLLOWING IF THE PATIENT REFUSES THE INFLUENZA VACCINE:
Yes Risks/benefits discussed with patient/surrogate/Vaccine Information Sheet (VIS) provided-VIS date: 8/15/19
